# Patient Record
Sex: MALE | URBAN - METROPOLITAN AREA
[De-identification: names, ages, dates, MRNs, and addresses within clinical notes are randomized per-mention and may not be internally consistent; named-entity substitution may affect disease eponyms.]

---

## 2022-06-29 ENCOUNTER — EMERGENCY (EMERGENCY)
Facility: HOSPITAL | Age: 22
LOS: 0 days | Discharge: HOME | End: 2022-06-29
Attending: EMERGENCY MEDICINE
Payer: COMMERCIAL

## 2022-06-29 VITALS
OXYGEN SATURATION: 95 % | TEMPERATURE: 98 F | HEIGHT: 73 IN | HEART RATE: 81 BPM | WEIGHT: 145.06 LBS | RESPIRATION RATE: 18 BRPM | DIASTOLIC BLOOD PRESSURE: 78 MMHG | SYSTOLIC BLOOD PRESSURE: 123 MMHG

## 2022-06-29 DIAGNOSIS — F32.A DEPRESSION, UNSPECIFIED: ICD-10-CM

## 2022-06-29 DIAGNOSIS — F95.2 TOURETTE'S DISORDER: ICD-10-CM

## 2022-06-29 DIAGNOSIS — R45.851 SUICIDAL IDEATIONS: ICD-10-CM

## 2022-06-29 DIAGNOSIS — R42 DIZZINESS AND GIDDINESS: ICD-10-CM

## 2022-06-29 PROCEDURE — 99282 EMERGENCY DEPT VISIT SF MDM: CPT

## 2022-06-29 PROCEDURE — 99284 EMERGENCY DEPT VISIT MOD MDM: CPT

## 2022-06-29 NOTE — ED PROVIDER NOTE - PROVIDER TOKENS
PROVIDER:[TOKEN:[[id number]],FOLLOWUP:[Urgent]],PROVIDER:[TOKEN:[[id number]],FOLLOWUP:[Urgent]]

## 2022-06-29 NOTE — ED STATDOCS - CARE PROVIDER_API CALL
Sheila Farias (MD)  Neurology  [address], [place] [id number]  Phone: [phone]  Fax: [phone]  Follow Up Time: Urgent    Rohit Mejia; PhD)  Neurology  [address] [facility] [address], [address]  [place] [address]  Phone: [phone]  Fax: [phone]  Follow Up Time: Urgent

## 2022-06-29 NOTE — ED STATDOCS - OBJECTIVE STATEMENT
22 y/o male w/ a PMHx of depression, Tourette's, and mild aortic mitral valve leak presents to the ED c/o increased physical Sx from Tourette's. Pt mother reports pt was at therapy when the therapist called her and advised pt to bring pt to ED for worsening ticks. Pt mother states the ticks are taking a physical and emotional toll on him. Pt states he is currently taking Lexapro and Risperdal prescribed by Psych Dr. Harp. Pt reports his ticks make his head move causing dizziness. PCP Dr. Ogden. Denies SI at this time, states he did have SI earlier today and yesterday.

## 2022-06-29 NOTE — ED ADULT TRIAGE NOTE - CHIEF COMPLAINT QUOTE
Pt presents to er with complaints of needing neurological evaluation from increased physical symptoms of Tourette's syndrome, states he is currently taking Lexapro and Risperdal from psychiatry , sees  PCP, tried to make an zoya with neurology and could not get a neuro zoya until AUG, states he takes all meds at night, pt states he has a history of mild aortic and mitral valve leak, denies taking cardiac medications or need of surgical intervention at this time.

## 2022-06-29 NOTE — ED PROVIDER NOTE - CARE PROVIDERS DIRECT ADDRESSES
[email or web address]

## 2022-06-29 NOTE — ED STATDOCS - PROGRESS NOTE DETAILS
Name placed in referral book.  Attending attempted numerous call to neurology without success to secure appointment.  Carlota Martinez PA-C Pt. is a 21 year old male Hx tourette' s, depression presents with increased tick movement.  Mother was called by therapist and advised to bring to ED for worsting tics.  Very emotional toll on patient.  Pt. with head movements. time, states he did have SI earlier today and yesterday.

## 2022-06-29 NOTE — ED STATDOCS - CLINICAL SUMMARY MEDICAL DECISION MAKING FREE TEXT BOX
20 y/o male w/ Tourette's sent in to see neuro as he unable to decompose w/ tick, will discuss w/ neuro

## 2022-06-29 NOTE — ED PROVIDER NOTE - CARE PROVIDER_API CALL
Sheila Farias (MD)  Neurology  [address] [place], [place] [id number]  Phone: [phone]  Fax: [phone]  Follow Up Time: Urgent    Rohit Mejia; PhD)  Neurology  [address] [facility], [address]  [place] [address]  Phone: [phone]  Fax: [phone]  Follow Up Time: Urgent

## 2022-06-29 NOTE — ED STATDOCS - NSFOLLOWUPINSTRUCTIONS_ED_ALL_ED_FT
makes sudden and repeated movements or sounds (tics). There are three types of tic disorders:  •Transient or provisional tic disorder (common). This type usually goes away within a year or two.      •Chronic or persistent tic disorder. This type may last all through childhood and continue into the adult years.      •Tourette syndrome (rare). This type lasts through all of life. It often occurs with other disorders.      Tic disorders start before age 18, usually between age 5 and 10. These disorders cannot be cured, but there are many treatments that can help manage tics. Most tic disorders get better over time.      What are the causes?    The cause of this condition is not known.      What are the signs or symptoms?    The main symptom of this condition is experiencing tics. There are four types of tics:  •Simple motor tics. These are movements in one area of the body.      •Complex motor tics. These are movements in large areas or in several areas of the body.      •Simple vocal tics. These are single sounds.      •Complex vocal tics. These are sounds that include several words or phrases.      Tics range in severity and may be more severe when you are stressed or tired. Tics can change over time.    Symptoms of simple motor tics    •Blinking, squinting, or eyebrow raising.      •Nose wrinkling.      •Mouth twitching, grimacing, or making tongue movements.      •Head nodding or twisting.      •Shoulder shrugging.      •Arm jerking.      •Foot shaking.      Symptoms of complex motor tics    •Grooming behavior, such as combing one's hair.      •Smelling objects.      •Jumping.      •Imitating others' behavior.      •Making rude or obscene gestures.      Symptoms of simple vocal tics    •Coughing.      •Humming.      •Throat clearing.      •Grunting.      •Yawning.      •Sniffing.      •Barking.      •Snorting.      Symptoms of complex vocal tics    •Imitating what others say.    •Saying words and sentences that may:  •Seem out of context.      •Being rude.          How is this diagnosed?    This condition is diagnosed based on:  •Your symptoms.      •Your medical history.      •A physical exam.      •An exam of your nervous system (neurological exam).    •Tests. These may be done to rule out other conditions that cause symptoms like tics. Tests may include:  •Blood tests.      •Brain imaging tests.        Your health care provider will ask you about:  •The type of tics you have.      •When the tics started and how often they happen.      •How the tics affect your daily activities.      •Other medical issues you may have.      •Whether you take over-the-counter or prescription medicines.      •Whether you use any drugs.      You may be referred to a brain and nerve specialist (neurologist) or a mental health specialist for further evaluation.      How is this treated?     Treatment for this condition depends on how severe your tics are. If they are mild, you may not need treatment. If they are more severe, you may benefit from treatment. Some treatments include:•Cognitive behavioral therapy. This kind of therapy involves talking to a mental health professional. The therapist can help you to:  •Become more aware of your tics.      •Learn ways to control your tics.      •Know how to disguise your tics.        •Family therapy. This kind of therapy provides education and emotional support for your family members.      •Medicine that helps to control tics.      •Medicine that is injected into the body to relax muscles (botulinum toxin). This may be a treatment option if your tics are severe.      •Electrical stimulation of the brain (deep brain stimulation). This may be a treatment option if your tics are severe.        Follow these instructions at home:    •Take over-the-counter and prescription medicines only as told by your health care provider.      •Check with your health care provider before using any new prescription or over-the-counter medicines.      •Keep all follow-up visits as told by your health care provider. This is important.        Contact a health care provider if:    •You are not able to take your medicines as prescribed.      •Your symptoms get worse.      •Your symptoms are interfering with your ability to function normally at home, work, or school.      •You have new or unusual symptoms like pain or weakness.      •Your symptoms make you feel depressed or anxious.        Summary    •A tic disorder is a condition in which a person makes sudden and repeated movements or sounds.      •Tic disorders start before age 18, usually between the age of 5 and 10.      •Many tic disorders are mild and do not need treatment.      •These disorders cannot be cured, but there are many treatments that can help manage tics.      This information is not intended to replace advice given to you by your health care provider. Make sure you discuss any questions you have with your health care provider.

## 2022-07-13 ENCOUNTER — APPOINTMENT (OUTPATIENT)
Dept: NEUROLOGY | Facility: CLINIC | Age: 22
End: 2022-07-13

## 2022-07-13 VITALS — BODY MASS INDEX: 19.22 KG/M2 | WEIGHT: 145 LBS | HEIGHT: 73 IN

## 2022-07-13 VITALS — DIASTOLIC BLOOD PRESSURE: 66 MMHG | SYSTOLIC BLOOD PRESSURE: 117 MMHG | HEART RATE: 76 BPM

## 2022-07-13 DIAGNOSIS — F95.2 TOURETTE'S DISORDER: ICD-10-CM

## 2022-07-13 DIAGNOSIS — G44.89 OTHER HEADACHE SYNDROME: ICD-10-CM

## 2022-07-13 PROCEDURE — 99203 OFFICE O/P NEW LOW 30 MIN: CPT

## 2022-07-14 PROBLEM — F95.2 TOURETTE'S DISORDER: Chronic | Status: ACTIVE | Noted: 2022-06-29

## 2022-07-14 PROBLEM — F32.A DEPRESSION, UNSPECIFIED: Chronic | Status: ACTIVE | Noted: 2022-06-29

## 2022-07-21 ENCOUNTER — APPOINTMENT (OUTPATIENT)
Dept: MRI IMAGING | Facility: CLINIC | Age: 22
End: 2022-07-21

## 2022-07-26 RX ORDER — LORAZEPAM 2 MG/1
2 TABLET ORAL
Qty: 1 | Refills: 0 | Status: ACTIVE | COMMUNITY
Start: 2022-07-22 | End: 1900-01-01

## 2022-07-27 NOTE — DISCUSSION/SUMMARY
[FreeTextEntry1] : MRI head to investigate tumors and extent of structural brain abnormalities causing tics. EEG to investigate epilepsy and abnormal brain function. Neuropsychological test for determining functional impairment due to   anxiety and OCD and necessity of intervention.

## 2022-07-27 NOTE — PHYSICAL EXAM

## 2022-07-27 NOTE — HISTORY OF PRESENT ILLNESS
[FreeTextEntry1] : 21 year old accompanied by parents with a history of worsening tics interfering with work and social activity. Also admits obsession with thoughts: having to touch things in a specific manner. \par He was referred in the ED department at [facility]. Patient has been diagnosed with Tourettes. He has been diagnosed with depression but having this condition makes it worse and he has pain all over his head, face, jaw and neck. He has been continuos stretching his mouth often. \par \par he has constant physical and mental pain and told his therapist that he wanted to kill himself- thus he had to get go to ER in [facility]. \par \par He was diagnosed with Tourette since he was 9. He is OCD, anxious and on the spectrum disorders. \par \par His triggers are video games, school work/home work, watching hockey, work, parent's separation. Incomplete tasks make it worse at work. \par \par His current medications include risperidone half of 0.5mg, and escitalopram oxalate 10mg. \par \par He currently goes to therapy once a week, playing video games makes him feel better. He used to play hockey when he was younger but has aged out since. \par \par He tends to hurt himself by punching him or cursing himself when he is unable to get a task done on a daily basis. \par \par He has constant physical and mental pain and told his therapist that he wanted to kill himself- thus he had to get go to ER in [facility]. He constantly said "he wanted to end it" after coming home from school.

## 2022-08-15 ENCOUNTER — APPOINTMENT (OUTPATIENT)
Dept: NEUROLOGY | Facility: CLINIC | Age: 22
End: 2022-08-15

## 2022-08-15 PROCEDURE — 95816 EEG AWAKE AND DROWSY: CPT

## 2022-08-18 ENCOUNTER — NON-APPOINTMENT (OUTPATIENT)
Age: 22
End: 2022-08-18

## 2022-09-30 ENCOUNTER — APPOINTMENT (OUTPATIENT)
Dept: MRI IMAGING | Facility: CLINIC | Age: 22
End: 2022-09-30

## 2022-09-30 ENCOUNTER — OUTPATIENT (OUTPATIENT)
Dept: OUTPATIENT SERVICES | Facility: HOSPITAL | Age: 22
LOS: 1 days | End: 2022-09-30

## 2022-09-30 DIAGNOSIS — F95.2 TOURETTE'S DISORDER: ICD-10-CM

## 2022-09-30 PROCEDURE — 70551 MRI BRAIN STEM W/O DYE: CPT | Mod: 26

## 2022-10-17 ENCOUNTER — NON-APPOINTMENT (OUTPATIENT)
Age: 22
End: 2022-10-17

## 2023-01-04 ENCOUNTER — APPOINTMENT (OUTPATIENT)
Dept: NEUROLOGY | Facility: CLINIC | Age: 23
End: 2023-01-04
